# Patient Record
Sex: FEMALE | Race: WHITE | NOT HISPANIC OR LATINO | Employment: UNEMPLOYED | URBAN - METROPOLITAN AREA
[De-identification: names, ages, dates, MRNs, and addresses within clinical notes are randomized per-mention and may not be internally consistent; named-entity substitution may affect disease eponyms.]

---

## 2020-01-08 ENCOUNTER — OFFICE VISIT (OUTPATIENT)
Dept: OBSTETRICS AND GYNECOLOGY | Facility: CLINIC | Age: 45
End: 2020-01-08

## 2020-01-08 VITALS — BODY MASS INDEX: 31.41 KG/M2 | WEIGHT: 183 LBS | SYSTOLIC BLOOD PRESSURE: 116 MMHG | DIASTOLIC BLOOD PRESSURE: 70 MMHG

## 2020-01-08 DIAGNOSIS — Z13.9 SCREENING FOR CONDITION: ICD-10-CM

## 2020-01-08 DIAGNOSIS — Z91.89 RELIES ON PARTNER VASECTOMY FOR CONTRACEPTION: ICD-10-CM

## 2020-01-08 DIAGNOSIS — Z01.419 ROUTINE GYNECOLOGICAL EXAMINATION: Primary | ICD-10-CM

## 2020-01-08 DIAGNOSIS — Z01.419 PAP SMEAR, LOW-RISK: ICD-10-CM

## 2020-01-08 LAB
B-HCG UR QL: NEGATIVE
BILIRUB BLD-MCNC: NEGATIVE MG/DL
CLARITY, POC: CLEAR
COLOR UR: YELLOW
GLUCOSE UR STRIP-MCNC: NEGATIVE MG/DL
INTERNAL NEGATIVE CONTROL: NEGATIVE
INTERNAL POSITIVE CONTROL: POSITIVE
KETONES UR QL: ABNORMAL
LEUKOCYTE EST, POC: NEGATIVE
Lab: 55
NITRITE UR-MCNC: NEGATIVE MG/ML
PH UR: 5 [PH] (ref 5–8)
PROT UR STRIP-MCNC: ABNORMAL MG/DL
RBC # UR STRIP: NEGATIVE /UL
SP GR UR: 1 (ref 1–1.03)
UROBILINOGEN UR QL: NORMAL

## 2020-01-08 PROCEDURE — 81025 URINE PREGNANCY TEST: CPT | Performed by: OBSTETRICS & GYNECOLOGY

## 2020-01-08 PROCEDURE — 99386 PREV VISIT NEW AGE 40-64: CPT | Performed by: OBSTETRICS & GYNECOLOGY

## 2020-01-08 RX ORDER — HYDROCODONE BITARTRATE AND ACETAMINOPHEN 7.5; 325 MG/1; MG/1
TABLET ORAL
COMMUNITY
Start: 2019-12-31

## 2020-01-08 RX ORDER — BUSPIRONE HYDROCHLORIDE 15 MG/1
TABLET ORAL
COMMUNITY
Start: 2020-01-07

## 2020-01-08 RX ORDER — LAMOTRIGINE 100 MG/1
TABLET ORAL
COMMUNITY
Start: 2019-12-15

## 2020-01-08 RX ORDER — LAMOTRIGINE 25 MG/1
TABLET ORAL
COMMUNITY
Start: 2019-12-15

## 2020-01-08 RX ORDER — AMITRIPTYLINE HYDROCHLORIDE 25 MG/1
TABLET, FILM COATED ORAL
COMMUNITY
Start: 2020-01-07 | End: 2020-06-10

## 2020-01-08 RX ORDER — CYCLOBENZAPRINE HCL 10 MG
TABLET ORAL
COMMUNITY
Start: 2019-12-10

## 2020-01-08 RX ORDER — GABAPENTIN 300 MG/1
CAPSULE ORAL
COMMUNITY
Start: 2019-12-15

## 2020-01-08 RX ORDER — BUPROPION HYDROCHLORIDE 300 MG/1
TABLET ORAL
Status: ON HOLD | COMMUNITY
Start: 2019-12-29 | End: 2021-03-04

## 2020-01-08 RX ORDER — SULFASALAZINE 500 MG/1
TABLET, DELAYED RELEASE ORAL
COMMUNITY
Start: 2019-12-03

## 2020-01-08 RX ORDER — CALCIUM CARBONATE/VITAMIN D3 500-10/5ML
LIQUID (ML) ORAL
COMMUNITY
Start: 2019-10-27

## 2020-01-08 RX ORDER — LISINOPRIL 20 MG/1
20 TABLET ORAL DAILY
COMMUNITY
Start: 2019-10-21

## 2020-01-08 RX ORDER — ZIPRASIDONE HYDROCHLORIDE 80 MG/1
CAPSULE ORAL
Status: ON HOLD | COMMUNITY
Start: 2020-01-07 | End: 2021-03-04

## 2020-01-08 RX ORDER — POTASSIUM CHLORIDE 1500 MG/1
TABLET, FILM COATED, EXTENDED RELEASE ORAL
COMMUNITY
Start: 2015-07-21

## 2020-01-08 NOTE — PROGRESS NOTES
EVALUATION AND MANAGEMENT    Patient Care Team:  Sonido Navarro APRN as PCP - General  Sonido Navarro APRN as PCP - Family Medicine    Patient new to practice? {yes and no:72972}  Patient new to examiner? {yes and no:19138}  Patient referred? {yes and no:35012}  -----------------------------------------------------HISTORY---------------------------------------------------    Chief Complaint:   Chief Complaint   Patient presents with   • Gynecologic Exam     hasn't been to office in several years, irregular periods     New problem to examiner? {yes and no:53777}    44 y.o. No obstetric history on file. Patient's last menstrual period was 12/25/2019 (approximate).    HPI: History of Present Illness      HPI:  1. Location: ***  2. Severity:  ***  3.  Quality:  ***  4. Modifying factors:  ***  5.  Associated signs/symptoms:  ***  6. Pt denies:  ***    PFSH:   1.  No past medical history on file.  2. No family history on file.  3. Social History: :  Single  Employment/occupation:  {yes and no:72652}   Smoker: {Smoker Yes No:09132}   Alcohol: {yes and no:97170}  Recreational drugs: {yes and no:72499}    PAST HISTORY REVIEWED:  1. No past surgical history on file.   2.   Current Outpatient Medications:   •  Omeprazole powder, , Disp: , Rfl:   •  potassium chloride ER (K-TAB) 20 MEQ tablet controlled-release ER tablet, Take  by mouth., Disp: , Rfl:   •  amitriptyline (ELAVIL) 25 MG tablet, , Disp: , Rfl:   •  buPROPion XL (WELLBUTRIN XL) 300 MG 24 hr tablet, TK 1 T PO QAM, Disp: , Rfl:   •  busPIRone (BUSPAR) 15 MG tablet, , Disp: , Rfl:   •  cyclobenzaprine (FLEXERIL) 10 MG tablet, TK 1 T PO BID UTD, Disp: , Rfl:   •  gabapentin (NEURONTIN) 300 MG capsule, TAKE 1 C PO QID AS DIRECTED, Disp: , Rfl:   •  HYDROcodone-acetaminophen (NORCO) 7.5-325 MG per tablet, TK 1 T PO BID PRN, Disp: , Rfl:   •  lamoTRIgine (LaMICtal) 100 MG tablet, TK 1 T PO HS, Disp: , Rfl:   •  lamoTRIgine (LaMICtal) 25 MG tablet, TAKE 2 TS  PO QHS, Disp: , Rfl:   •  lisinopril (PRINIVIL,ZESTRIL) 20 MG tablet, Take 20 mg by mouth Daily., Disp: , Rfl:   •  Magnesium Oxide 400 MG capsule, TK 1 T PO QD, Disp: , Rfl:   •  sulfaSALAzine (AZULFIDINE) 500 MG EC tablet, TK 3 TS PO BID, Disp: , Rfl:   •  ziprasidone (GEODON) 80 MG capsule, , Disp: , Rfl:   3. No Known Allergies    ROS:  Review of Systems:    -----------------------------------------------PHYSICAL EXAM----------------------------------------------    Vital Signs: /70   Wt 83 kg (183 lb)   LMP 12/25/2019 (Approximate)   Breastfeeding No   BMI 31.41 kg/m²    Flowsheet Rows      First Filed Value   Admission Height  --   Admission Weight  83 kg (183 lb) Documented at 01/08/2020 0903          Physical Exam    -----------------------------------------------MEDICAL DECISION MAKING-----------------------------      DATA Review & labs ordered:     1.   Lab Results (last 24 hours)     Procedure Component Value Units Date/Time    POC Pregnancy, Urine [[id number]]  (Normal) Collected:  01/08/20 0917    Specimen:  Urine Updated:  01/08/20 0918     HCG, Urine, QL Negative     Lot Number 55     Internal Positive Control Positive     Internal Negative Control Negative    POC Urinalysis Dipstick [[id number]]  (Abnormal) Collected:  01/08/20 0916    Specimen:  Urine Updated:  01/08/20 0917     Color Yellow     Clarity, UA Clear     Glucose, UA Negative mg/dL      Bilirubin Negative     Ketones, UA Trace     Specific Gravity  1.005     Blood, UA Negative     pH, Urine 5.0     Protein, POC Trace mg/dL      Urobilinogen, UA Normal     Leukocytes Negative     Nitrite, UA Negative        2.   Imaging Results (Last 24 Hours)     ** No results found for the last 24 hours. **        3.   ECG/EMG Results (most recent)     None        4. Old records reviewed? {yes and no:91981}  5. Old records ordered?  {yes and no:14720}  6. Labs ordered?: Yes:  urinalysis  7. Imaging other than ultrasound ordered?: {yes and  no:85670}  8. Diagnoses and/or chronic conditions reviewed with pt:       Jojo was seen today for gynecologic exam.    Diagnoses and all orders for this visit:    Screening for condition  -     POC Urinalysis Dipstick  -     POC Pregnancy, Urine      9. Risk counseling done:  yes  10. Ultrasound ordered and reviewed?   {yes and no:85087}      IMPRESSION & DIAGNOSIS:      There is no problem list on file for this patient.      ***  Established problem/s? {yes and no:86720}   Worsening? {yes and no:03087}  New Problem/s? {yes and no:82271}   Additional workup planned? {yes and no:94539}      PLAN:     ***    RTO No follow-ups on file.       TIME: More than 50% of time spent in counseling and/or coordination of care.Time spent in counseling *** min.  Counseling included the following topics *** with prognosis, differential diagnosis, risks, benefits of treatment, instructions, compliance and/or risk reduction and alternatives.       Diego Wheeler MD  9:42 AM  01/08/20

## 2020-01-10 LAB
CYTOLOGIST CVX/VAG CYTO: NORMAL
CYTOLOGY CVX/VAG DOC CYTO: NORMAL
CYTOLOGY CVX/VAG DOC THIN PREP: NORMAL
DX ICD CODE: NORMAL
HIV 1 & 2 AB SER-IMP: NORMAL
HPV I/H RISK 1 DNA CVX QL PROBE+SIG AMP: NEGATIVE
OTHER STN SPEC: NORMAL
STAT OF ADQ CVX/VAG CYTO-IMP: NORMAL

## 2020-06-29 ENCOUNTER — TELEPHONE (OUTPATIENT)
Dept: URGENT CARE | Facility: CLINIC | Age: 45
End: 2020-06-29

## 2020-06-29 NOTE — TELEPHONE ENCOUNTER
Left message for pt regarding Negative COVID test.  Advised CDC recommends self quarantine for 10 days from sx onset, fever free x 72 hrs without use of Tylenol/Advil and improvement of sx before release from self-quarantine.  To follow up with PCP for further eval if sx persist or ER if sx worsen.        ----- Message from Bud Wei MD sent at 6/29/2020  6:58 AM EDT -----  Neg test

## 2020-10-06 ENCOUNTER — OFFICE VISIT (OUTPATIENT)
Dept: OBSTETRICS AND GYNECOLOGY | Facility: CLINIC | Age: 45
End: 2020-10-06

## 2020-10-06 VITALS
DIASTOLIC BLOOD PRESSURE: 84 MMHG | BODY MASS INDEX: 37.17 KG/M2 | WEIGHT: 217.7 LBS | HEIGHT: 64 IN | SYSTOLIC BLOOD PRESSURE: 126 MMHG

## 2020-10-06 DIAGNOSIS — Z91.89 RELIES ON PARTNER VASECTOMY FOR CONTRACEPTION: ICD-10-CM

## 2020-10-06 DIAGNOSIS — N95.1 MENOPAUSE SYNDROME: ICD-10-CM

## 2020-10-06 DIAGNOSIS — Z13.9 SCREENING FOR CONDITION: Primary | ICD-10-CM

## 2020-10-06 DIAGNOSIS — E66.9 OBESITY (BMI 30-39.9): ICD-10-CM

## 2020-10-06 DIAGNOSIS — F17.210 CIGARETTE SMOKER: ICD-10-CM

## 2020-10-06 LAB
BILIRUB BLD-MCNC: NEGATIVE MG/DL
CLARITY, POC: CLEAR
COLOR UR: YELLOW
GLUCOSE UR STRIP-MCNC: NEGATIVE MG/DL
KETONES UR QL: NEGATIVE
LEUKOCYTE EST, POC: NEGATIVE
NITRITE UR-MCNC: NEGATIVE MG/ML
PH UR: 5 [PH] (ref 5–8)
PROT UR STRIP-MCNC: NEGATIVE MG/DL
RBC # UR STRIP: NEGATIVE /UL
SP GR UR: 1 (ref 1–1.03)
UROBILINOGEN UR QL: NORMAL

## 2020-10-06 PROCEDURE — 99213 OFFICE O/P EST LOW 20 MIN: CPT | Performed by: OBSTETRICS & GYNECOLOGY

## 2020-10-06 RX ORDER — PHENTERMINE HYDROCHLORIDE 15 MG/1
15 CAPSULE ORAL DAILY
Status: ON HOLD | COMMUNITY
Start: 2020-10-01 | End: 2021-03-04

## 2020-10-06 NOTE — PROGRESS NOTES
GYN Annual Exam     CC- Here for annual exam.     Pt new to practice? No  Pt new to me? No     Audrey L Pompeo is a 45 y.o. No obstetric history on file. female who presents for annual well woman exam. No LMP recorded (lmp unknown).    Problems in addition to need for annual: ***    HPI: History of Present Illness    PMHX:  Patient Active Problem List   Diagnosis   • Relies on partner vasectomy for contraception   ; otherwise none    OB History    No obstetric history on file.           Past Medical History:   Diagnosis Date   • Bipolar 1 disorder (CMS/HCC)    • Chronic pain    • COPD (chronic obstructive pulmonary disease) (CMS/HCC)    • GERD (gastroesophageal reflux disease)    • Neuropathy    • Pneumonia    • RA (rheumatoid arthritis) (CMS/HCC)        Past Surgical History:   Procedure Laterality Date   • BACK SURGERY     • CARPAL TUNNEL RELEASE     • CHEST TUBE INSERTION     • TOE SURGERY           Current Outpatient Medications:   •  phentermine 15 MG capsule, Take 15 mg by mouth Daily., Disp: , Rfl:   •  albuterol sulfate  (90 Base) MCG/ACT inhaler, INL 2 PFS ITL Q 6 H PRF WHZ, Disp: , Rfl:   •  buPROPion XL (WELLBUTRIN XL) 300 MG 24 hr tablet, TK 1 T PO QAM, Disp: , Rfl:   •  busPIRone (BUSPAR) 15 MG tablet, , Disp: , Rfl:   •  cyclobenzaprine (FLEXERIL) 10 MG tablet, TK 1 T PO BID UTD, Disp: , Rfl:   •  diclofenac (VOLTAREN) 75 MG EC tablet, Take 75 mg by mouth 2 (Two) Times a Day., Disp: , Rfl:   •  doxepin (SINEquan) 100 MG capsule, TK 1 C PO HS, Disp: , Rfl:   •  doxycycline (VIBRAMYCIN) 100 MG capsule, 1 po bid, Disp: 20 capsule, Rfl: 0  •  gabapentin (NEURONTIN) 300 MG capsule, TAKE 1 C PO QID AS DIRECTED, Disp: , Rfl:   •  guaiFENesin ER 1200 MG tablet sustained-release 12 hour, Mucus Relief ER 1,200 mg tablet, extended release, Disp: , Rfl:   •  HUMIRA PEN 40 MG/0.4ML Pen-injector Kit, , Disp: , Rfl:   •  HYDROcodone-acetaminophen (NORCO) 7.5-325 MG per tablet, TK 1 T PO BID PRN, Disp: , Rfl:  "  •  lamoTRIgine (LaMICtal) 100 MG tablet, TK 1 T PO HS, Disp: , Rfl:   •  lamoTRIgine (LaMICtal) 25 MG tablet, TAKE 2 TS PO QHS, Disp: , Rfl:   •  lisinopril (PRINIVIL,ZESTRIL) 20 MG tablet, Take 20 mg by mouth Daily., Disp: , Rfl:   •  Magnesium Oxide 400 MG capsule, TK 1 T PO QD, Disp: , Rfl:   •  montelukast (SINGULAIR) 10 MG tablet, Take 1 tablet by mouth Every Night., Disp: 30 tablet, Rfl: 0  •  omeprazole (priLOSEC) 20 MG capsule, TK 1 C PO BID FOR 14 DAYS, Disp: , Rfl:   •  potassium chloride ER (K-TAB) 20 MEQ tablet controlled-release ER tablet, Take  by mouth., Disp: , Rfl:   •  sulfaSALAzine (AZULFIDINE) 500 MG EC tablet, TK 3 TS PO BID, Disp: , Rfl:   •  TRELEGY ELLIPTA 100-62.5-25 MCG/INH aerosol powder , , Disp: , Rfl:   •  vitamin D (ERGOCALCIFEROL) 1.25 MG (90206 UT) capsule capsule, TK 1 C PO Q 7 DAYS, Disp: , Rfl:   •  ziprasidone (GEODON) 80 MG capsule, , Disp: , Rfl:     No Known Allergies    Social History     Tobacco Use   • Smoking status: Current Every Day Smoker     Packs/day: 1.00     Types: Cigarettes     Start date: 1/8/1994   • Smokeless tobacco: Never Used   Substance Use Topics   • Alcohol use: Not Currently   • Drug use: Not Currently       Smoker: {Smoker Yes No:98210}    History reviewed. No pertinent family history.    Review of Systems        EXAM:  /84   Ht 162.6 cm (64.02\")   Wt 98.7 kg (217 lb 11.2 oz)   LMP  (LMP Unknown) Comment: 3-4 months ago   Breastfeeding No   BMI 37.35 kg/m²     Physical Exam  Vitals signs and nursing note reviewed. Exam conducted with a chaperone present.   Constitutional:       General: She is not in acute distress.     Appearance: She is well-developed. She is not diaphoretic.   HENT:      Head: Normocephalic and atraumatic.      Nose: Nose normal.   Eyes:      Extraocular Movements: Extraocular movements intact.   Neck:      Musculoskeletal: Normal range of motion.   Cardiovascular:      Rate and Rhythm: Normal rate.   Pulmonary:      " Effort: Pulmonary effort is normal.   Chest:      Breasts: Breasts are symmetrical.         Right: Normal. No mass, nipple discharge, skin change or tenderness.         Left: Normal. No mass, nipple discharge, skin change or tenderness.   Abdominal:      General: There is no distension.      Palpations: Abdomen is soft. There is no mass.      Tenderness: There is no abdominal tenderness. There is no guarding.   Genitourinary:     General: Normal vulva.      Pubic Area: No rash.       Vagina: Normal. No vaginal discharge.      Cervix: Normal.      Uterus: Normal.       Adnexa: Right adnexa normal and left adnexa normal.   Musculoskeletal: Normal range of motion.         General: No tenderness or deformity.   Lymphadenopathy:      Upper Body:      Right upper body: No axillary adenopathy.      Left upper body: No axillary adenopathy.   Skin:     General: Skin is warm and dry.      Coloration: Skin is not pale.      Findings: No erythema or rash.   Neurological:      Mental Status: She is alert and oriented to person, place, and time.   Psychiatric:         Behavior: Behavior normal.         Thought Content: Thought content normal.         Judgment: Judgment normal.            As part of wellness and prevention, the following topics were discussed with the patient:  []  Nutrition  []  Physical activity/regular exercise   [x]  Healthy weight  []  Injury prevention  [x]  Substance misuse/abuse  []  Sexual behavior  []  STD prevention  []  Contaception  []  Dental health  [x]  Mental health  []  Immunization  [x]  Encouraged SBE     Counseling and guidance done:  Nutrition, physical activity, healthy weight, injury prevention, misuse of tobacco, alcohol and drugs, sexual behavior and STDs, contraception, dental health, mental health, immunizations breast cancer screening and exams.    Assessment     1) GYN annual well woman exam.   2) PAP done today? {yes and no:40869}  3) problems addressed: ***    MAMMOGRAM UP TO DATE  IF AGE APPROPRIATE?  {yes and no:83803}    COLONOSCOPY UP TO DATE IF AGE APPROPRIATE? {yes and no:36685}    Fhx breast cancer? {yes and no:25271}    Fhx ovarian cancer? {yes and no:89190}    Fhx colon cancer? {yes and no:12083}    Invitae testing offered? {yes and no:56459}           Plan       Follow up prn or one year.    Jojo was seen today for follow-up.    Diagnoses and all orders for this visit:    Screening for condition  -     POC Urinalysis Dipstick    Well woman exam        RTO Return in about 1 year (around 10/6/2021) for Annual physical.    TIME: More than 50% of time spent in counseling and/or coordination of care.Time spent in counseling *** min.  Counseling included the following topics *** with prognosis, differential diagnosis, risks, benefits of treatment, instructions, compliance and/or risk reduction and alternatives.       Diego Wheeler MD  [unfilled]  11:16 EDT

## 2020-10-06 NOTE — PROGRESS NOTES
EVALUATION AND MANAGEMENT ENCOUNTER    Audrey L Pompeo  Patient new to examiner? No  New problem to examiner? No  Patient referred? No    -----------------------------------------------------HISTORY---------------------------------------------------    Chief Complaint:   Chief Complaint   Patient presents with   • Follow-up     Discuss Menopause       HPI:  Audrey L Pompeo is a 45 y.o. No obstetric history on file. with No LMP recorded (lmp unknown). here for menopause testing.    Pt thinks she may be in early stages of menopause, pt has missed a few periods, has insomnia and hot flashes, with vaginal dryness.  Pt denies irregular bleeding, fever or UTI symptoms. Pt has been feeling this way for a couple of months.  Is trying to quit smoking.    History of Present Illness    PFSH:   1.    Past Medical History:   Diagnosis Date   • Bipolar 1 disorder (CMS/[facility])    • Chronic pain    • COPD (chronic obstructive pulmonary disease) (CMS/[facility])    • GERD (gastroesophageal reflux disease)    • Neuropathy    • Pneumonia    • RA (rheumatoid arthritis) (CMS/[facility])      2. History reviewed. No pertinent family history.  3.  Smoker: Yes  Audrey L Pompeo  reports that she has been smoking cigarettes. She started smoking about 26 years ago. She has been smoking about 1.00 pack per day. She has never used smokeless tobacco.. I have educated her on the risk of diseases from using tobacco products such as cancer, COPD and heart disease.     I advised her to quit and she is willing to quit. We have discussed the following method/s for tobacco cessation:  Education Material.      I spent 5 minutes counseling the patient.     , Alcohol: No, Recreational drugs: No    PAST HISTORY REVIEWED:  1.   Past Surgical History:   Procedure Laterality Date   • BACK SURGERY     • CARPAL TUNNEL RELEASE     • CHEST TUBE INSERTION     • TOE SURGERY        2.   Current Outpatient Medications:   •  phentermine 15 MG capsule, Take 15 mg by mouth Daily., Disp:  , Rfl:   •  albuterol sulfate  (90 Base) MCG/ACT inhaler, INL 2 PFS ITL Q 6 H PRF WHZ, Disp: , Rfl:   •  buPROPion XL (WELLBUTRIN XL) 300 MG 24 hr tablet, TK 1 T PO QAM, Disp: , Rfl:   •  busPIRone (BUSPAR) 15 MG tablet, , Disp: , Rfl:   •  cyclobenzaprine (FLEXERIL) 10 MG tablet, TK 1 T PO BID UTD, Disp: , Rfl:   •  diclofenac (VOLTAREN) 75 MG EC tablet, Take 75 mg by mouth 2 (Two) Times a Day., Disp: , Rfl:   •  doxepin (SINEquan) 100 MG capsule, TK 1 C PO HS, Disp: , Rfl:   •  doxycycline (VIBRAMYCIN) 100 MG capsule, 1 po bid, Disp: 20 capsule, Rfl: 0  •  gabapentin (NEURONTIN) 300 MG capsule, TAKE 1 C PO QID AS DIRECTED, Disp: , Rfl:   •  guaiFENesin ER 1200 MG tablet sustained-release 12 hour, Mucus Relief ER 1,200 mg tablet, extended release, Disp: , Rfl:   •  HUMIRA PEN 40 MG/0.4ML Pen-injector Kit, , Disp: , Rfl:   •  HYDROcodone-acetaminophen (NORCO) 7.5-325 MG per tablet, TK 1 T PO BID PRN, Disp: , Rfl:   •  lamoTRIgine (LaMICtal) 100 MG tablet, TK 1 T PO HS, Disp: , Rfl:   •  lamoTRIgine (LaMICtal) 25 MG tablet, TAKE 2 TS PO QHS, Disp: , Rfl:   •  lisinopril (PRINIVIL,ZESTRIL) 20 MG tablet, Take 20 mg by mouth Daily., Disp: , Rfl:   •  Magnesium Oxide 400 MG capsule, TK 1 T PO QD, Disp: , Rfl:   •  montelukast (SINGULAIR) 10 MG tablet, Take 1 tablet by mouth Every Night., Disp: 30 tablet, Rfl: 0  •  omeprazole (priLOSEC) 20 MG capsule, TK 1 C PO BID FOR 14 DAYS, Disp: , Rfl:   •  potassium chloride ER (K-TAB) 20 MEQ tablet controlled-release ER tablet, Take  by mouth., Disp: , Rfl:   •  sulfaSALAzine (AZULFIDINE) 500 MG EC tablet, TK 3 TS PO BID, Disp: , Rfl:   •  TRELEGY ELLIPTA 100-62.5-25 MCG/INH aerosol powder , , Disp: , Rfl:   •  vitamin D (ERGOCALCIFEROL) 1.25 MG (18313 UT) capsule capsule, TK 1 C PO Q 7 DAYS, Disp: , Rfl:   •  ziprasidone (GEODON) 80 MG capsule, , Disp: , Rfl:   3. No Known Allergies    ROS:  Review of Systems   Constitutional: Negative.    HENT: Negative.    Eyes:  "Negative.    Respiratory: Negative.    Cardiovascular: Negative.    Gastrointestinal: Negative.    Endocrine: Positive for heat intolerance.   Musculoskeletal: Negative.    Skin: Negative.    Allergic/Immunologic: Negative.    Neurological: Negative.    Hematological: Negative.    Psychiatric/Behavioral: Negative.    :    -----------------------------------------------PHYSICAL EXAM----------------------------------------------    Vital Signs: /84   Ht 162.6 cm (64.02\")   Wt 98.7 kg (217 lb 11.2 oz)   LMP  (LMP Unknown) Comment: 3-4 months ago   Breastfeeding No   BMI 37.35 kg/m²    Flowsheet Rows      First Filed Value   Admission Height  162.6 cm (64.02\") Documented at 10/06/2020 1059   Admission Weight  98.7 kg (217 lb 11.2 oz) Documented at 10/06/2020 1059          Physical Exam  Vitals signs and nursing note reviewed.   Constitutional:       Appearance: She is well-developed.   HENT:      Head: Normocephalic and atraumatic.   Neck:      Musculoskeletal: Normal range of motion.   Cardiovascular:      Rate and Rhythm: Normal rate.   Pulmonary:      Effort: Pulmonary effort is normal.   Abdominal:      General: There is no distension.      Palpations: Abdomen is soft. There is no mass.      Tenderness: There is no abdominal tenderness. There is no guarding.   Genitourinary:     Vagina: No vaginal discharge.   Musculoskeletal: Normal range of motion.         General: No tenderness or deformity.   Skin:     General: Skin is warm and dry.      Coloration: Skin is not pale.      Findings: No erythema or rash.   Neurological:      Mental Status: She is alert and oriented to person, place, and time.   Psychiatric:         Behavior: Behavior normal.         Thought Content: Thought content normal.         Judgment: Judgment normal.         -----------------------------------------------MEDICAL DECISION MAKING-----------------------------        DATA Review & labs ordered:     1.   Lab Results (last 24 hours)  "    Procedure Component Value Units Date/Time    POC Urinalysis Dipstick [[id number]]  (Normal) Collected: 10/06/20 1100    Specimen: Urine Updated: 10/06/20 1101     Color Yellow     Clarity, UA Clear     Glucose, UA Negative mg/dL      Bilirubin Negative     Ketones, UA Negative     Specific Gravity  1.005     Blood, UA Negative     pH, Urine 5.0     Protein, POC Negative mg/dL      Urobilinogen, UA Normal     Leukocytes Negative     Nitrite, UA Negative        2.   Imaging Results (Last 24 Hours)     ** No results found for the last 24 hours. **        3.   ECG/EMG Results (most recent)     None          9. Diagnoses and/or chronic conditions reviewed:      Jojo was seen today for follow-up.    Diagnoses and all orders for this visit:    Screening for condition  -     POC Urinalysis Dipstick  -     Follicle Stimulating Hormone    Relies on partner vasectomy for contraception    Cigarette smoker    Menopause syndrome    Obesity (BMI 30-39.9)        IMPRESSION/PROBLEM:      Probably early menopause.     (Established problem/s? No, worsening? Yes)    (New Problem/s? Yes, additional workup planned? Yes)      PLAN:     1. Check FSH level  2.stop smoking.    3.  Will call with results.    Pt to call for any results from testing promptly if she does not hear from us.     RTO Return if symptoms worsen or fail to improve. .  Instructions and precautions given.     TIME: More than 50% of time spent in counseling and/or coordination of care.Time spent in counseling 20 min.  Counseling included the following topics menopause syndrome with prognosis, differential diagnosis, risks, benefits of treatment, instructions, compliance and/or risk reduction and alternatives.       Diego Wheeler MD  11:24 EDT  10/06/20

## 2020-10-07 LAB — FSH SERPL-ACNC: 3.1 MIU/ML

## 2021-02-26 ENCOUNTER — TRANSCRIBE ORDERS (OUTPATIENT)
Dept: SLEEP MEDICINE | Facility: HOSPITAL | Age: 46
End: 2021-02-26

## 2021-02-26 DIAGNOSIS — Z01.818 OTHER SPECIFIED PRE-OPERATIVE EXAMINATION: Primary | ICD-10-CM

## 2021-03-02 ENCOUNTER — LAB (OUTPATIENT)
Dept: LAB | Facility: HOSPITAL | Age: 46
End: 2021-03-02

## 2021-03-02 DIAGNOSIS — Z01.818 OTHER SPECIFIED PRE-OPERATIVE EXAMINATION: ICD-10-CM

## 2021-03-02 PROCEDURE — C9803 HOPD COVID-19 SPEC COLLECT: HCPCS

## 2021-03-02 PROCEDURE — U0004 COV-19 TEST NON-CDC HGH THRU: HCPCS

## 2021-03-03 LAB — SARS-COV-2 RNA RESP QL NAA+PROBE: NOT DETECTED

## 2021-03-04 ENCOUNTER — HOSPITAL ENCOUNTER (OUTPATIENT)
Facility: HOSPITAL | Age: 46
Setting detail: HOSPITAL OUTPATIENT SURGERY
Discharge: HOME | End: 2021-03-04
Attending: INTERNAL MEDICINE | Admitting: INTERNAL MEDICINE

## 2021-03-04 ENCOUNTER — ANESTHESIA EVENT (OUTPATIENT)
Dept: GASTROENTEROLOGY | Facility: HOSPITAL | Age: 46
End: 2021-03-04

## 2021-03-04 ENCOUNTER — APPOINTMENT (OUTPATIENT)
Dept: GENERAL RADIOLOGY | Facility: HOSPITAL | Age: 46
End: 2021-03-04

## 2021-03-04 ENCOUNTER — ANESTHESIA (OUTPATIENT)
Dept: GASTROENTEROLOGY | Facility: HOSPITAL | Age: 46
End: 2021-03-04

## 2021-03-04 VITALS
SYSTOLIC BLOOD PRESSURE: 121 MMHG | RESPIRATION RATE: 18 BRPM | HEIGHT: 64 IN | OXYGEN SATURATION: 92 % | HEART RATE: 102 BPM | BODY MASS INDEX: 37.73 KG/M2 | DIASTOLIC BLOOD PRESSURE: 60 MMHG | WEIGHT: 221 LBS

## 2021-03-04 DIAGNOSIS — J84.9 INTERSTITIAL LUNG DISEASE (HCC): ICD-10-CM

## 2021-03-04 LAB
APPEARANCE FLD: ABNORMAL
B PARAPERT DNA SPEC QL NAA+PROBE: NOT DETECTED
B PERT DNA SPEC QL NAA+PROBE: NOT DETECTED
B-HCG UR QL: NEGATIVE
C PNEUM DNA NPH QL NAA+NON-PROBE: NOT DETECTED
COLOR FLD: YELLOW
FLUAV SUBTYP SPEC NAA+PROBE: NOT DETECTED
FLUBV RNA ISLT QL NAA+PROBE: NOT DETECTED
GIE STN SPEC: NORMAL
GIE STN SPEC: NORMAL
HADV DNA SPEC NAA+PROBE: NOT DETECTED
HCOV 229E RNA SPEC QL NAA+PROBE: NOT DETECTED
HCOV HKU1 RNA SPEC QL NAA+PROBE: NOT DETECTED
HCOV NL63 RNA SPEC QL NAA+PROBE: NOT DETECTED
HCOV OC43 RNA SPEC QL NAA+PROBE: NOT DETECTED
HMPV RNA NPH QL NAA+NON-PROBE: NOT DETECTED
HPIV1 RNA SPEC QL NAA+PROBE: NOT DETECTED
HPIV2 RNA SPEC QL NAA+PROBE: NOT DETECTED
HPIV3 RNA NPH QL NAA+PROBE: NOT DETECTED
HPIV4 P GENE NPH QL NAA+PROBE: NOT DETECTED
INTERNAL NEGATIVE CONTROL: NEGATIVE
INTERNAL POSITIVE CONTROL: POSITIVE
Lab: NORMAL
M PNEUMO IGG SER IA-ACNC: NOT DETECTED
METHOD: ABNORMAL
MONOS+MACROS NFR FLD: 71 %
NEUTROPHILS NFR FLD MANUAL: 29 %
NUC CELL # FLD: 90 /MM3
RBC # FLD AUTO: 125 /MM3
RHINOVIRUS RNA SPEC NAA+PROBE: NOT DETECTED
RSV RNA NPH QL NAA+NON-PROBE: NOT DETECTED

## 2021-03-04 PROCEDURE — 88112 CYTOPATH CELL ENHANCE TECH: CPT | Performed by: INTERNAL MEDICINE

## 2021-03-04 PROCEDURE — 87206 SMEAR FLUORESCENT/ACID STAI: CPT | Performed by: INTERNAL MEDICINE

## 2021-03-04 PROCEDURE — 87071 CULTURE AEROBIC QUANT OTHER: CPT | Performed by: INTERNAL MEDICINE

## 2021-03-04 PROCEDURE — 87070 CULTURE OTHR SPECIMN AEROBIC: CPT | Performed by: INTERNAL MEDICINE

## 2021-03-04 PROCEDURE — 88305 TISSUE EXAM BY PATHOLOGIST: CPT | Performed by: INTERNAL MEDICINE

## 2021-03-04 PROCEDURE — 0100U HC BIOFIRE FILMARRAY RESP PANEL 2: CPT | Performed by: INTERNAL MEDICINE

## 2021-03-04 PROCEDURE — 87116 MYCOBACTERIA CULTURE: CPT | Performed by: INTERNAL MEDICINE

## 2021-03-04 PROCEDURE — 87176 TISSUE HOMOGENIZATION CULTR: CPT | Performed by: INTERNAL MEDICINE

## 2021-03-04 PROCEDURE — 89051 BODY FLUID CELL COUNT: CPT | Performed by: INTERNAL MEDICINE

## 2021-03-04 PROCEDURE — 87205 SMEAR GRAM STAIN: CPT | Performed by: INTERNAL MEDICINE

## 2021-03-04 PROCEDURE — 25010000002 HYDROCORTISONE SODIUM SUCCINATE 100 MG RECONSTITUTED SOLUTION: Performed by: NURSE ANESTHETIST, CERTIFIED REGISTERED

## 2021-03-04 PROCEDURE — 87102 FUNGUS ISOLATION CULTURE: CPT | Performed by: INTERNAL MEDICINE

## 2021-03-04 PROCEDURE — 25010000002 PROPOFOL 10 MG/ML EMULSION: Performed by: NURSE ANESTHETIST, CERTIFIED REGISTERED

## 2021-03-04 PROCEDURE — 81025 URINE PREGNANCY TEST: CPT | Performed by: INTERNAL MEDICINE

## 2021-03-04 PROCEDURE — 71045 X-RAY EXAM CHEST 1 VIEW: CPT

## 2021-03-04 PROCEDURE — 88312 SPECIAL STAINS GROUP 1: CPT | Performed by: INTERNAL MEDICINE

## 2021-03-04 RX ORDER — LIDOCAINE HYDROCHLORIDE 10 MG/ML
INJECTION, SOLUTION EPIDURAL; INFILTRATION; INTRACAUDAL; PERINEURAL AS NEEDED
Status: DISCONTINUED | OUTPATIENT
Start: 2021-03-04 | End: 2021-03-04 | Stop reason: HOSPADM

## 2021-03-04 RX ORDER — SODIUM CHLORIDE, SODIUM LACTATE, POTASSIUM CHLORIDE, CALCIUM CHLORIDE 600; 310; 30; 20 MG/100ML; MG/100ML; MG/100ML; MG/100ML
1000 INJECTION, SOLUTION INTRAVENOUS CONTINUOUS
Status: DISCONTINUED | OUTPATIENT
Start: 2021-03-04 | End: 2021-03-04 | Stop reason: HOSPADM

## 2021-03-04 RX ORDER — LIDOCAINE 50 MG/G
OINTMENT TOPICAL AS NEEDED
Status: DISCONTINUED | OUTPATIENT
Start: 2021-03-04 | End: 2021-03-04 | Stop reason: HOSPADM

## 2021-03-04 RX ORDER — LIDOCAINE HYDROCHLORIDE 20 MG/ML
INJECTION, SOLUTION EPIDURAL; INFILTRATION; INTRACAUDAL; PERINEURAL AS NEEDED
Status: DISCONTINUED | OUTPATIENT
Start: 2021-03-04 | End: 2021-03-04 | Stop reason: HOSPADM

## 2021-03-04 RX ORDER — LIDOCAINE HYDROCHLORIDE 20 MG/ML
INJECTION, SOLUTION INFILTRATION; PERINEURAL AS NEEDED
Status: DISCONTINUED | OUTPATIENT
Start: 2021-03-04 | End: 2021-03-04 | Stop reason: SURG

## 2021-03-04 RX ORDER — PREDNISONE 20 MG/1
20 TABLET ORAL DAILY
COMMUNITY

## 2021-03-04 RX ORDER — SODIUM CHLORIDE 0.9 % (FLUSH) 0.9 %
10 SYRINGE (ML) INJECTION AS NEEDED
Status: DISCONTINUED | OUTPATIENT
Start: 2021-03-04 | End: 2021-03-04 | Stop reason: HOSPADM

## 2021-03-04 RX ORDER — PROPOFOL 10 MG/ML
VIAL (ML) INTRAVENOUS AS NEEDED
Status: DISCONTINUED | OUTPATIENT
Start: 2021-03-04 | End: 2021-03-04 | Stop reason: SURG

## 2021-03-04 RX ORDER — GLYCOPYRROLATE 0.2 MG/ML
INJECTION INTRAMUSCULAR; INTRAVENOUS AS NEEDED
Status: DISCONTINUED | OUTPATIENT
Start: 2021-03-04 | End: 2021-03-04 | Stop reason: SURG

## 2021-03-04 RX ORDER — HYDROXYZINE HYDROCHLORIDE 10 MG/1
10 TABLET, FILM COATED ORAL 3 TIMES DAILY
COMMUNITY

## 2021-03-04 RX ADMIN — PROPOFOL 180 MCG/KG/MIN: 10 INJECTION, EMULSION INTRAVENOUS at 10:17

## 2021-03-04 RX ADMIN — PROPOFOL 100 MG: 10 INJECTION, EMULSION INTRAVENOUS at 10:19

## 2021-03-04 RX ADMIN — PROPOFOL 200 MG: 10 INJECTION, EMULSION INTRAVENOUS at 10:14

## 2021-03-04 RX ADMIN — SODIUM CHLORIDE, POTASSIUM CHLORIDE, SODIUM LACTATE AND CALCIUM CHLORIDE 1000 ML: 600; 310; 30; 20 INJECTION, SOLUTION INTRAVENOUS at 09:48

## 2021-03-04 RX ADMIN — GLYCOPYRROLATE 0.2 MG: 0.2 INJECTION INTRAMUSCULAR; INTRAVENOUS at 10:23

## 2021-03-04 RX ADMIN — HYDROCORTISONE SODIUM SUCCINATE 25 MG: 100 INJECTION, POWDER, FOR SOLUTION INTRAMUSCULAR; INTRAVENOUS at 10:07

## 2021-03-04 RX ADMIN — LIDOCAINE HYDROCHLORIDE 60 MG: 20 INJECTION, SOLUTION INFILTRATION; PERINEURAL at 10:14

## 2021-03-04 NOTE — ANESTHESIA POSTPROCEDURE EVALUATION
"Patient: Audrey L Pompeo    Procedure Summary     Date: 03/04/21 Room / Location:  [facility]    Anesthesia Start: 1008 Anesthesia Stop: 1059    Procedure: BRONCHOSCOPY WITH BAL, BRUSHINGS, AND BIOPSIES (N/A Bronchus) Diagnosis:     Surgeon: Shane Toney MD Provider: Royce Walker MD    Anesthesia Type: general ASA Status: 3          Anesthesia Type: general    Vitals  Vitals Value Taken Time   /60 03/04/21 1131   Temp     Pulse 102 03/04/21 1131   Resp 18 03/04/21 1131   SpO2 92 % 03/04/21 1131           Post Anesthesia Care and Evaluation    Patient location during evaluation: bedside  Patient participation: complete - patient participated  Level of consciousness: awake and alert  Pain management: adequate  Airway patency: patent  Anesthetic complications: No anesthetic complications    Cardiovascular status: acceptable  Respiratory status: acceptable  Hydration status: acceptable    Comments: /60 (BP Location: Left arm, Patient Position: Lying)   Pulse 102   Resp 18   Ht 161.3 cm (63.5\")   Wt 100 kg (221 lb)   LMP 03/03/2021 (Exact Date)   SpO2 92%   BMI 38.53 kg/m²       "

## 2021-03-04 NOTE — H&P
Follow-up addendum H&P please see my office H&P from 2/25/2021    Patient is here for bronchoscopy she has had progressive interstitial lung disease she has underlying COPD she continues to smoke differentials include respiratory bronchiolitis associated tissue lung disease from her smoking we have been trying to get her to stop to see if is cleared up see if we can get her off steroids she has been unable to stop we have offered multiple cessation and counseling options she is having progressing symptoms and bronchoscopy to evaluate.  Exam: See vital signs on the nursing note  Patient is alert and oriented  Chest clear no wheezes no rales  CV regular rate rhythm no murmur  Abdomen soft no palpable hepatosplenomegaly    Plan diagnostic bronchoscopy: Risk benefits and options discussed at length with the patient in the office and obviously she has given consent.  Plans proceed with bronchoscopy today

## 2021-03-04 NOTE — ANESTHESIA PREPROCEDURE EVALUATION
Anesthesia Evaluation     Patient summary reviewed and Nursing notes reviewed   NPO Solid Status: > 8 hours  NPO Liquid Status: > 2 hours           Airway   Mallampati: III  TM distance: >3 FB  Neck ROM: full  no difficulty expected  Dental - normal exam     Pulmonary - normal exam   (+) pneumonia , COPD,   (-) decreased breath sounds, wheezes  Cardiovascular - normal exam  Exercise tolerance: good (4-7 METS)    (-) hypertension      Neuro/Psych  (+) psychiatric history Bipolar,     (-) seizures, CVA  GI/Hepatic/Renal/Endo    (+) obesity,     (-) diabetes    Musculoskeletal     Abdominal  - normal exam   Substance History - negative use  (-) alcohol use, drug use     OB/GYN negative ob/gyn ROS         Other   arthritis,                    Anesthesia Plan    ASA 3     general   (Stress dose given, 25mg hydrocortisone)  intravenous induction     Anesthetic plan, all risks, benefits, and alternatives have been provided, discussed and informed consent has been obtained with: patient.    Plan discussed with CRNA.

## 2021-03-05 LAB
CYTO UR: NORMAL
CYTO UR: NORMAL
LAB AP CASE REPORT: NORMAL
LAB AP CASE REPORT: NORMAL
LAB AP CLINICAL INFORMATION: NORMAL
LAB AP DIAGNOSIS COMMENT: NORMAL
PATH REPORT.FINAL DX SPEC: NORMAL
PATH REPORT.FINAL DX SPEC: NORMAL
PATH REPORT.GROSS SPEC: NORMAL
PATH REPORT.GROSS SPEC: NORMAL

## 2021-03-06 LAB
BACTERIA SPEC AEROBE CULT: NORMAL
BACTERIA SPEC RESP CULT: NORMAL
GRAM STN SPEC: NORMAL

## 2021-03-07 LAB
BACTERIA SPEC AEROBE CULT: NORMAL
GRAM STN SPEC: NORMAL

## 2021-04-01 LAB
FUNGUS WND CULT: NORMAL
FUNGUS WND CULT: NORMAL

## 2021-04-04 LAB — FUNGUS WND CULT: ABNORMAL

## 2021-04-15 LAB
MYCOBACTERIUM SPEC CULT: NORMAL
NIGHT BLUE STAIN TISS: NORMAL

## 2021-05-04 NOTE — ANESTHESIA PROCEDURE NOTES
Airway  Urgency: elective    Date/Time: 3/4/2021 10:16 AM  Airway not difficult    General Information and Staff    Patient location during procedure: OR  Anesthesiologist: Royce Walker MD  CRNA: Jamila Mccall CRNA    Indications and Patient Condition  Indications for airway management: airway protection    Preoxygenated: yes  MILS maintained throughout  Mask difficulty assessment: 1 - vent by mask    Final Airway Details  Final airway type: supraglottic airway      Successful airway: bronch  Size 4    Number of attempts at approach: 1  Assessment: lips, teeth, and gum same as pre-op    Additional Comments  Pt preoxygenated, sivi, LMA placed with adequate seal and TV             -- DO NOT REPLY / DO NOT REPLY ALL --  -- Message is from the [facility]--    Patient is requesting a medication refill - medication is on active medication list    Patient is currently OUT of the requested medication.    Was Medication Pended?  Yes.    Rx Name and Dose:   metoPROLOL succinate (TOPROL-XL) 50 MG 24 hr tablet  lisinopril (ZESTRIL) 20 MG tablet    Duration: 90 days    Pharmacy  [id number] [address] [facility] [address]    Patient confirmed the above pharmacy as correct?  Yes    Caller Information       Type Contact Phone    05/03/2021 07:31 PM CDT Phone (Incoming) Edinson Kim (Self) [phone] (M)          Alternative phone number: n/a    Turnaround time given to caller:   \"This message will be sent to [state Provider's name]. The clinical team will fulfill your request as soon as they review your message when the office opens tomorrow.\"

## 2022-09-26 NOTE — PROGRESS NOTES
GYN Annual Exam     CC- Here for annual exam.     Pt new to practice? Yes  Pt new to me? Yes     HPI: History of Present Illness      Audrey L Pompeo is a 44 y.o. female who presents for annual well woman exam. Patient's last menstrual period was 12/25/2019 (approximate).    Problems in addition to need for annual: smoker.  Counseled.    MAMMOGRAM UP TO DATE IF AGE APPROPRIATE?  No    COLONOSCOPY UP TO DATE IF AGE APPROPRIATE? No    Fhx breast cancer? No    Fhx ovarian cancer? No    Fhx colon cancer? No    Invitae testing offered? No      PMHX:  Patient Active Problem List   Diagnosis   • Relies on partner vasectomy for contraception   ; otherwise none    OB History    None           History reviewed. No pertinent past medical history.    History reviewed. No pertinent surgical history.      Current Outpatient Medications:   •  Omeprazole powder, , Disp: , Rfl:   •  potassium chloride ER (K-TAB) 20 MEQ tablet controlled-release ER tablet, Take  by mouth., Disp: , Rfl:   •  amitriptyline (ELAVIL) 25 MG tablet, , Disp: , Rfl:   •  buPROPion XL (WELLBUTRIN XL) 300 MG 24 hr tablet, TK 1 T PO QAM, Disp: , Rfl:   •  busPIRone (BUSPAR) 15 MG tablet, , Disp: , Rfl:   •  cyclobenzaprine (FLEXERIL) 10 MG tablet, TK 1 T PO BID UTD, Disp: , Rfl:   •  gabapentin (NEURONTIN) 300 MG capsule, TAKE 1 C PO QID AS DIRECTED, Disp: , Rfl:   •  HYDROcodone-acetaminophen (NORCO) 7.5-325 MG per tablet, TK 1 T PO BID PRN, Disp: , Rfl:   •  lamoTRIgine (LaMICtal) 100 MG tablet, TK 1 T PO HS, Disp: , Rfl:   •  lamoTRIgine (LaMICtal) 25 MG tablet, TAKE 2 TS PO QHS, Disp: , Rfl:   •  lisinopril (PRINIVIL,ZESTRIL) 20 MG tablet, Take 20 mg by mouth Daily., Disp: , Rfl:   •  Magnesium Oxide 400 MG capsule, TK 1 T PO QD, Disp: , Rfl:   •  sulfaSALAzine (AZULFIDINE) 500 MG EC tablet, TK 3 TS PO BID, Disp: , Rfl:   •  ziprasidone (GEODON) 80 MG capsule, , Disp: , Rfl:     No Known Allergies    Social History     Tobacco Use   • Smoking status:  Current Every Day Smoker     Packs/day: 1.00     Types: Cigarettes     Start date: 1/8/1994   Substance Use Topics   • Alcohol use: Not Currently   • Drug use: Not Currently       Smoker: Yes  Audrey L Pompeo  reports that she has been smoking cigarettes. She started smoking about 26 years ago. She has been smoking about 1.00 pack per day. She does not have any smokeless tobacco history on file.. I have educated her on the risk of diseases from using tobacco products such as cancer, COPD and heart diease.     I advised her to quit and she is not willing to quit.    I spent 5 minutes counseling the patient.         History reviewed. No pertinent family history.    Review of Systems        EXAM:  /70   Wt 83 kg (183 lb)   LMP 12/25/2019 (Approximate)   Breastfeeding No   BMI 31.41 kg/m²     Physical Exam   Constitutional: She is oriented to person, place, and time. She appears well-developed and well-nourished. No distress.   HENT:   Head: Normocephalic and atraumatic.   Eyes: EOM are normal.   Neck: Normal range of motion.   Cardiovascular: Normal rate.   Pulmonary/Chest: Effort normal.   Abdominal: Soft. She exhibits no distension and no mass. There is no tenderness. There is no guarding.   Genitourinary: Vagina normal and uterus normal. No vaginal discharge found.   Genitourinary Comments: cx wnl, pap done   Musculoskeletal: Normal range of motion. She exhibits no edema, tenderness or deformity.   Neurological: She is alert and oriented to person, place, and time.   Skin: Skin is warm and dry. No rash noted. She is not diaphoretic. No erythema. No pallor.   Breasts wnl, bilaterally   Psychiatric: She has a normal mood and affect. Her behavior is normal. Judgment and thought content normal.   Nursing note and vitals reviewed.         As part of wellness and prevention, the following topics were discussed with the patient:  []  Nutrition  []  Physical activity/regular exercise   [x]  Healthy weight  []   Injury prevention  [x]  Substance misuse/abuse  []  Sexual behavior  []  STD prevention  []  Contaception  []  Dental health  [x]  Mental health  []  Immunization  [x]  Encouraged SBE     Counseling and guidance done:  Nutrition, physical activity, healthy weight, injury prevention, misuse of tobacco, alcohol and drugs, sexual behavior and STDs, contraception, dental health, mental health, immunizations breast cancer screening and exams.    Assessment     1) GYN annual well woman exam.   2) PAP done today? Yes  3) problems addressed: none       Plan       Follow up prn or one year.    Jojo was seen today for gynecologic exam.    Diagnoses and all orders for this visit:    Routine gynecological examination  -     Pap IG, HPV-hr    Screening for condition  -     POC Urinalysis Dipstick  -     POC Pregnancy, Urine    Pap smear, low-risk  -     Pap IG, HPV-hr    Relies on partner vasectomy for contraception      Pt will order her own mammogram.     RTO Return in about 1 year (around 1/8/2021) for Annual physical.        Diego Wheeler MD  [unfilled]  10:29 AM     Oxybutynin Counseling:  I discussed with the patient the risks of oxybutynin including but not limited to skin rash, drowsiness, dry mouth, difficulty urinating, and blurred vision.

## (undated) DEVICE — SINGLE USE SUCTION VALVE MAJ-209: Brand: SINGLE USE SUCTION VALVE (STERILE)

## (undated) DEVICE — SENSR O2 OXIMAX FNGR A/ 18IN NONSTR

## (undated) DEVICE — VITAL SIGNS™ JACKSON-REES CIRCUITS: Brand: VITAL SIGNS™

## (undated) DEVICE — TUBING, SUCTION, 1/4" X 10', STRAIGHT: Brand: MEDLINE

## (undated) DEVICE — FRCP BX RADJAW4 PULM WO NDL STD1.8X2 100

## (undated) DEVICE — ADAPT CLN BIOGUARD AIR/H2O DISP

## (undated) DEVICE — MSK AIRWY LARYNG LMA PILOT SZ4

## (undated) DEVICE — ADAPT SWVL FIBROPTIC BRONCH

## (undated) DEVICE — SINGLE USE BIOPSY VALVE MAJ-210: Brand: SINGLE USE BIOPSY VALVE (STERILE)

## (undated) DEVICE — CONMED DISPOSABLE BRONCHIAL CYTOLOGY BRUSH, STRAIGHT HANDLE, 3 MM X 120 CM: Brand: CONMED

## (undated) DEVICE — LN SMPL O2 NASL/ORL SMART/CAPNOLINE PLS A/

## (undated) DEVICE — TRAP,MUCUS SPECIMEN, 80CC: Brand: MEDLINE

## (undated) DEVICE — CANNULA,OXY,ADULT,SUPER SOFT,W/14'TUB,UC: Brand: MEDLINE INDUSTRIES, INC.